# Patient Record
Sex: FEMALE | Race: WHITE | NOT HISPANIC OR LATINO | Employment: UNEMPLOYED | URBAN - NONMETROPOLITAN AREA
[De-identification: names, ages, dates, MRNs, and addresses within clinical notes are randomized per-mention and may not be internally consistent; named-entity substitution may affect disease eponyms.]

---

## 2023-09-06 ENCOUNTER — TELEPHONE (OUTPATIENT)
Dept: OBSTETRICS AND GYNECOLOGY | Facility: CLINIC | Age: 23
End: 2023-09-06

## 2023-12-08 ENCOUNTER — OFFICE VISIT (OUTPATIENT)
Dept: OBSTETRICS AND GYNECOLOGY | Facility: CLINIC | Age: 23
End: 2023-12-08
Payer: MEDICAID

## 2023-12-08 ENCOUNTER — PATIENT MESSAGE (OUTPATIENT)
Dept: OBSTETRICS AND GYNECOLOGY | Facility: CLINIC | Age: 23
End: 2023-12-08

## 2023-12-08 VITALS
BODY MASS INDEX: 44.63 KG/M2 | SYSTOLIC BLOOD PRESSURE: 124 MMHG | TEMPERATURE: 98 F | DIASTOLIC BLOOD PRESSURE: 64 MMHG | HEIGHT: 61 IN | WEIGHT: 236.38 LBS

## 2023-12-08 DIAGNOSIS — Z12.4 SCREENING FOR MALIGNANT NEOPLASM OF THE CERVIX: ICD-10-CM

## 2023-12-08 DIAGNOSIS — L68.0 HIRSUTISM: ICD-10-CM

## 2023-12-08 DIAGNOSIS — Z01.411 ABNORMAL FEMALE PELVIC EXAM: Primary | ICD-10-CM

## 2023-12-08 DIAGNOSIS — E28.2 PCOS (POLYCYSTIC OVARIAN SYNDROME): ICD-10-CM

## 2023-12-08 PROCEDURE — 1159F PR MEDICATION LIST DOCUMENTED IN MEDICAL RECORD: ICD-10-PCS | Mod: CPTII,,,

## 2023-12-08 PROCEDURE — 3074F SYST BP LT 130 MM HG: CPT | Mod: CPTII,,,

## 2023-12-08 PROCEDURE — 3008F PR BODY MASS INDEX (BMI) DOCUMENTED: ICD-10-PCS | Mod: CPTII,,,

## 2023-12-08 PROCEDURE — 1160F PR REVIEW ALL MEDS BY PRESCRIBER/CLIN PHARMACIST DOCUMENTED: ICD-10-PCS | Mod: CPTII,,,

## 2023-12-08 PROCEDURE — 3008F BODY MASS INDEX DOCD: CPT | Mod: CPTII,,,

## 2023-12-08 PROCEDURE — 1160F RVW MEDS BY RX/DR IN RCRD: CPT | Mod: CPTII,,,

## 2023-12-08 PROCEDURE — 3074F PR MOST RECENT SYSTOLIC BLOOD PRESSURE < 130 MM HG: ICD-10-PCS | Mod: CPTII,,,

## 2023-12-08 PROCEDURE — 1159F MED LIST DOCD IN RCRD: CPT | Mod: CPTII,,,

## 2023-12-08 PROCEDURE — 3078F PR MOST RECENT DIASTOLIC BLOOD PRESSURE < 80 MM HG: ICD-10-PCS | Mod: CPTII,,,

## 2023-12-08 PROCEDURE — 99385 PR PREVENTIVE VISIT,NEW,18-39: ICD-10-PCS | Mod: ,,,

## 2023-12-08 PROCEDURE — 99385 PREV VISIT NEW AGE 18-39: CPT | Mod: ,,,

## 2023-12-08 PROCEDURE — 3078F DIAST BP <80 MM HG: CPT | Mod: CPTII,,,

## 2023-12-08 RX ORDER — SPIRONOLACTONE 25 MG/1
50 TABLET ORAL DAILY
Qty: 60 TABLET | Refills: 11 | Status: SHIPPED | OUTPATIENT
Start: 2023-12-08 | End: 2024-12-07

## 2023-12-08 NOTE — PROGRESS NOTES
Chief Complaint:  Well Woman    History of Present Illness:  Radha Deng is a 23 y.o. year old  presents for her well woman exam. Currently relying on NFP for birth control. Patient has monthly cycles lasting 5 days with heavy to light flow. Denies any irregular menstrual bleeding. Pt does c/o of excess hair growth. She states PCOS runs in her family and would like to have a work up    LMP: 2023  Frequency: monthly   Cycle length: 5 days   Flow: heavy to light  Intermenstrual bleeding: No  Postcoital bleeding: No  Dysmenorrhea: No  Sexually active: yes   Dyspareunia: No  Contraception: none   H/o STI: No   Last pap: none  H/o abnl pap: No   Colposcopy: none  Gardasil: /3   MMG: none  H/o abnl MMG: none  Colonoscopy: none      Review of Systems:  General/Constitutional: Chills denies. Fatigue/weakness denies. Fever denies. Night sweats denies. Hot flashes denies, +hair growth to chin    Respiratory: Cough denies. Hemoptysis denies. SOB denies. Sputum production denies. Wheezing denies .   Cardiovascular: Chest pain denies. Dizziness denies. Palpitations denies. Swelling in hands/feet denies.                Breast: Dimpling denies. Breast mass denies. Breast pain/tenderness denies. Nipple discharge denies. Puckering denies.  Gastrointestinal: Abdominal pain denies. Blood in stool denies. Constipation denies. Diarrhea denies. Heartburn denies. Nausea denies. Vomiting denies    Genitourinary: Incontinence denies. Blood in urine denies. Frequent urination denies. Painful urination denies. Urinary urgency denies. Nocturia denies    Gynecologic: Irregular menses denies. Heavy bleeding denies. Painful menses denies. Vaginal discharge denies. Vaginal odor denies. Vaginal itching denies. Vaginal lesion denies. Pelvic pain denies. Decreased libido denies. Vulvar lesion denies. Prolapse of genital organs denies. Painful intercourse denies. Postcoital bleeding denies    Psychiatric: Depression denies. Anxiety  "denies.     OB History    Para Term  AB Living   0 0 0 0 0 0   SAB IAB Ectopic Multiple Live Births   0 0 0 0 0      The patient has never been pregnant.    Past Medical History:   Diagnosis Date    Amenorrhea     Anxiety     Depression     Hormone disorder        Current Outpatient Medications:     spironolactone (ALDACTONE) 25 MG tablet, Take 2 tablets (50 mg total) by mouth once daily., Disp: 60 tablet, Rfl: 11    Review of patient's allergies indicates:  No Known Allergies    History reviewed. No pertinent surgical history.  Family History   Problem Relation Age of Onset    Rheum arthritis Maternal Grandmother     Osteoarthritis Maternal Grandfather     Diabetes Mother     Breast cancer Neg Hx     Colon cancer Neg Hx     Ovarian cancer Neg Hx     Uterine cancer Neg Hx     Cervical cancer Neg Hx      Social History     Socioeconomic History    Marital status: Single   Tobacco Use    Smoking status: Never     Passive exposure: Current    Smokeless tobacco: Never   Substance and Sexual Activity    Alcohol use: Yes     Alcohol/week: 3.0 standard drinks of alcohol     Types: 2 Glasses of wine, 1 Drinks containing 0.5 oz of alcohol per week    Drug use: Never    Sexual activity: Not Currently     Partners: Male     Birth control/protection: None       Physical Exam:  /64 (BP Location: Left arm, Patient Position: Sitting, BP Method: Medium (Manual))   Temp 97.5 °F (36.4 °C) (Temporal)   Ht 5' 1" (1.549 m)   Wt 107.2 kg (236 lb 6.4 oz)   LMP 2023 (Exact Date)   BMI 44.67 kg/m²     Chaperone: present.     General appearance: healthy, well-nourished and well-developed     Psychiatric: Orientation to time, place and person. Normal mood and affect and active, alert     Skin: Appearance: no rashes or lesions. Hair growth noted to chin    Neck:   Neck: supple, FROM, trachea midline. and no masses   Thyroid: no enlargement or nodules and non-tender.       Cardiovascular:   Auscultation: RRR and " no murmur.   Peripheral Vascular: no varicosities, LLE edema, RLE edema, calf tenderness, and palpable cord and pedal pulses intact.     Lungs:   Respiratory effort: no intercostal retractions or accessory muscle usage.   Auscultation: no wheezing, rales/crackles, or rhonchi and clear to auscultation.     Breast:   Inspection/Palpation: no tenderness, discrete/distinct masses, skin changes, or abnormal secretions. Nipple appearance normal.     Abdomen:   Auscultation/Inspection/Palpation: no hepatomegaly, splenomegaly, masses, tenderness or CVA tenderness and soft, non-distended bowel sounds preset.    Hernia: no palpable hernias.     Female Genitalia:    Vulva: no masses, tenderness or lesions    Bladder/Urethra: no urethral discharge or mass, normal meatus, bladder non-distended.    Vagina: no tenderness, erythema, cystocele, rectocele, abnormal vaginal discharge or vesicle(s) or ulcers    Cervix: no discharge, no cervical lacerations noted or motion tenderness and grossly normal    Uterus: normal size and shape and midline, non-tender, and no uterine prolapse.    Adnexa/Parametria: no parametrial tenderness or mass, no adnexal tenderness or ovarian mass.     Lymph Nodes:   Palpation: non tender submandibular nodes, axillary nodes, or inguinal nodes.     Rectal Exam:   Rectum: normal perianal skin.       Assessment/Plan:  1. Abnormal female pelvic exam  Pap  Advised patient if she notices any changes to her breasts, including a lump, mass, dimpling, discharge, rash or tenderness, to should contact us immediately   Healthy diet, exercise   Multivitamin   Seat belt   Sunscreen use   Safe sex/ STI education   Contraception evaluation: none   Gardasil evaluation: 1/3      2. PCOS (polycystic ovarian syndrome)  -     US Pelvis Comp with Transvag NON-OB (xpd; Future; Expected date: 12/22/2023  -     DHEA-Sulfate; Future; Expected date: 12/08/2023  -     Testosterone; Future; Expected date: 12/08/2023  -     Estradiol;  Future; Expected date: 12/08/2023  -     Follicle Stimulating Hormone; Future; Expected date: 12/08/2023  -     Luteinizing Hormone; Future; Expected date: 12/08/2023  -     Prolactin; Future; Expected date: 12/08/2023    3. Hirsutism  -     spironolactone (ALDACTONE) 25 MG tablet; Take 2 tablets (50 mg total) by mouth once daily.  Dispense: 60 tablet; Refill: 11    4. Screening for malignant neoplasm of the cervix  -     Liquid-Based Pap Smear, Screening Screening    F/u pending results or PRN    ISAMAR Osborne

## 2023-12-12 ENCOUNTER — PATIENT MESSAGE (OUTPATIENT)
Dept: OBSTETRICS AND GYNECOLOGY | Facility: CLINIC | Age: 23
End: 2023-12-12
Payer: MEDICAID

## 2023-12-12 ENCOUNTER — TELEPHONE (OUTPATIENT)
Dept: OBSTETRICS AND GYNECOLOGY | Facility: CLINIC | Age: 23
End: 2023-12-12
Payer: MEDICAID

## 2023-12-12 DIAGNOSIS — A74.9 CHLAMYDIA INFECTION: Primary | ICD-10-CM

## 2023-12-12 LAB — PSYCHE PATHOLOGY RESULT: NORMAL

## 2023-12-12 RX ORDER — DOXYCYCLINE 100 MG/1
100 CAPSULE ORAL 2 TIMES DAILY
Qty: 14 CAPSULE | Refills: 0 | Status: SHIPPED | OUTPATIENT
Start: 2023-12-12 | End: 2023-12-19

## 2023-12-12 NOTE — TELEPHONE ENCOUNTER
----- Message from SUSAN Copeland sent at 12/12/2023  3:02 PM CST -----  Please notify pt of +chlamydia on pap and treat with Doxy 100mg BID x 7days with a SARINA in 4 weeks. Thanks!

## 2023-12-20 ENCOUNTER — PATIENT MESSAGE (OUTPATIENT)
Dept: OBSTETRICS AND GYNECOLOGY | Facility: CLINIC | Age: 23
End: 2023-12-20
Payer: MEDICAID

## 2024-04-03 ENCOUNTER — TELEPHONE (OUTPATIENT)
Dept: OBSTETRICS AND GYNECOLOGY | Facility: CLINIC | Age: 24
End: 2024-04-03
Payer: MEDICAID